# Patient Record
Sex: FEMALE | Race: OTHER | NOT HISPANIC OR LATINO | URBAN - METROPOLITAN AREA
[De-identification: names, ages, dates, MRNs, and addresses within clinical notes are randomized per-mention and may not be internally consistent; named-entity substitution may affect disease eponyms.]

---
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
## 2021-02-09 NOTE — ED PROVIDER NOTE - CONDUCTED A DETAILED DISCUSSION WITH PATIENT AND/OR GUARDIAN REGARDING, MDM
[Good] : ~his/her~  mood as  good [Yes] : Yes [Patient reported mammogram was normal] : Patient reported mammogram was normal [Patient reported PAP Smear was normal] : Patient reported PAP Smear was normal [Patient reported colonoscopy was normal] : Patient reported colonoscopy was normal [Fully functional (bathing, dressing, toileting, transferring, walking, feeding)] : Fully functional (bathing, dressing, toileting, transferring, walking, feeding) [Fully functional (using the telephone, shopping, preparing meals, housekeeping, doing laundry, using] : Fully functional and needs no help or supervision to perform IADLs (using the telephone, shopping, preparing meals, housekeeping, doing laundry, using transportation, managing medications and managing finances) [One fall no injury in past year] : Patient reported one fall in the past year without injury [0] : 2) Feeling down, depressed, or hopeless: Not at all (0) [] : No [de-identified] : good [de-identified] : some exerciuse [MammogramDate] : 2020 [PapSmearDate] : 2020 [BoneDensityDate] : 2019 [BoneDensityComments] : osteoporosis [ColonoscopyDate] : 2018 [ColonoscopyComments] : polyps need for outpatient follow-up

## 2021-08-28 ENCOUNTER — EMERGENCY (EMERGENCY)
Facility: HOSPITAL | Age: 21
LOS: 1 days | Discharge: HOME | End: 2021-08-28
Attending: STUDENT IN AN ORGANIZED HEALTH CARE EDUCATION/TRAINING PROGRAM
Payer: MEDICAID

## 2021-08-28 VITALS
RESPIRATION RATE: 18 BRPM | HEART RATE: 82 BPM | OXYGEN SATURATION: 100 % | DIASTOLIC BLOOD PRESSURE: 80 MMHG | TEMPERATURE: 98 F | SYSTOLIC BLOOD PRESSURE: 118 MMHG

## 2021-08-28 PROCEDURE — 71045 X-RAY EXAM CHEST 1 VIEW: CPT | Mod: 26

## 2021-08-28 PROCEDURE — 99284 EMERGENCY DEPT VISIT MOD MDM: CPT

## 2021-08-28 NOTE — ED PROVIDER NOTE - CLINICAL SUMMARY MEDICAL DECISION MAKING FREE TEXT BOX
Patient presenting with sob, lung clear, history of similar 2/2 panic attack. will obtain lab, xray, treat anxiety and reassess

## 2021-08-28 NOTE — ED PROVIDER NOTE - OBJECTIVE STATEMENT
21 y.o presenting with panic attack, woke up feeling sob. per guardian, similar occurred in the past. no fever, chills, n, v noted today. patient currently does not want to talk, patient mother states that she has been like this in the past with panic attacks.

## 2021-08-28 NOTE — ED PROVIDER NOTE - PATIENT PORTAL LINK FT
You can access the FollowMyHealth Patient Portal offered by [facility] by registering at the following website: [email or web address]. By joining [employer]’s FollowMyHealth portal, you will also be able to view your health information using other applications (apps) compatible with our system.
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
## 2021-08-28 NOTE — ED ADULT NURSE NOTE - OBJECTIVE STATEMENT
pt presents to ED due to panic attack. As per family, pt woke up feeling of sob and not talking. Lately pt is having stress with BF and family problems. Hx of panic attacks x2. Denies any other complaints.

## 2021-08-29 VITALS
TEMPERATURE: 98 F | HEART RATE: 73 BPM | OXYGEN SATURATION: 100 % | DIASTOLIC BLOOD PRESSURE: 72 MMHG | RESPIRATION RATE: 18 BRPM | SYSTOLIC BLOOD PRESSURE: 109 MMHG

## 2021-08-29 LAB
ALBUMIN SERPL ELPH-MCNC: 3.4 G/DL — LOW (ref 3.5–5)
ALP SERPL-CCNC: 93 U/L — SIGNIFICANT CHANGE UP (ref 40–120)
ALT FLD-CCNC: 39 U/L DA — SIGNIFICANT CHANGE UP (ref 10–60)
ANION GAP SERPL CALC-SCNC: 7 MMOL/L — SIGNIFICANT CHANGE UP (ref 5–17)
AST SERPL-CCNC: 19 U/L — SIGNIFICANT CHANGE UP (ref 10–40)
BASOPHILS # BLD AUTO: 0.05 K/UL — SIGNIFICANT CHANGE UP (ref 0–0.2)
BASOPHILS NFR BLD AUTO: 0.6 % — SIGNIFICANT CHANGE UP (ref 0–2)
BILIRUB SERPL-MCNC: 0.3 MG/DL — SIGNIFICANT CHANGE UP (ref 0.2–1.2)
BUN SERPL-MCNC: 10 MG/DL — SIGNIFICANT CHANGE UP (ref 7–18)
CALCIUM SERPL-MCNC: 8.7 MG/DL — SIGNIFICANT CHANGE UP (ref 8.4–10.5)
CHLORIDE SERPL-SCNC: 109 MMOL/L — HIGH (ref 96–108)
CO2 SERPL-SCNC: 24 MMOL/L — SIGNIFICANT CHANGE UP (ref 22–31)
CREAT SERPL-MCNC: 0.63 MG/DL — SIGNIFICANT CHANGE UP (ref 0.5–1.3)
EOSINOPHIL # BLD AUTO: 0.17 K/UL — SIGNIFICANT CHANGE UP (ref 0–0.5)
EOSINOPHIL NFR BLD AUTO: 1.9 % — SIGNIFICANT CHANGE UP (ref 0–6)
ETHANOL SERPL-MCNC: <3 MG/DL — SIGNIFICANT CHANGE UP (ref 0–10)
GLUCOSE SERPL-MCNC: 92 MG/DL — SIGNIFICANT CHANGE UP (ref 70–99)
HCG SERPL-ACNC: <1 MIU/ML — SIGNIFICANT CHANGE UP
HCT VFR BLD CALC: 37.2 % — SIGNIFICANT CHANGE UP (ref 34.5–45)
HGB BLD-MCNC: 12.6 G/DL — SIGNIFICANT CHANGE UP (ref 11.5–15.5)
IMM GRANULOCYTES NFR BLD AUTO: 0.5 % — SIGNIFICANT CHANGE UP (ref 0–1.5)
LYMPHOCYTES # BLD AUTO: 1.63 K/UL — SIGNIFICANT CHANGE UP (ref 1–3.3)
LYMPHOCYTES # BLD AUTO: 18.5 % — SIGNIFICANT CHANGE UP (ref 13–44)
MCHC RBC-ENTMCNC: 29.6 PG — SIGNIFICANT CHANGE UP (ref 27–34)
MCHC RBC-ENTMCNC: 33.9 GM/DL — SIGNIFICANT CHANGE UP (ref 32–36)
MCV RBC AUTO: 87.5 FL — SIGNIFICANT CHANGE UP (ref 80–100)
MONOCYTES # BLD AUTO: 0.91 K/UL — HIGH (ref 0–0.9)
MONOCYTES NFR BLD AUTO: 10.3 % — SIGNIFICANT CHANGE UP (ref 2–14)
NEUTROPHILS # BLD AUTO: 6.01 K/UL — SIGNIFICANT CHANGE UP (ref 1.8–7.4)
NEUTROPHILS NFR BLD AUTO: 68.2 % — SIGNIFICANT CHANGE UP (ref 43–77)
NRBC # BLD: 0 /100 WBCS — SIGNIFICANT CHANGE UP (ref 0–0)
PLATELET # BLD AUTO: 288 K/UL — SIGNIFICANT CHANGE UP (ref 150–400)
POTASSIUM SERPL-MCNC: 3.9 MMOL/L — SIGNIFICANT CHANGE UP (ref 3.5–5.3)
POTASSIUM SERPL-SCNC: 3.9 MMOL/L — SIGNIFICANT CHANGE UP (ref 3.5–5.3)
PROT SERPL-MCNC: 7.6 G/DL — SIGNIFICANT CHANGE UP (ref 6–8.3)
RBC # BLD: 4.25 M/UL — SIGNIFICANT CHANGE UP (ref 3.8–5.2)
RBC # FLD: 12.6 % — SIGNIFICANT CHANGE UP (ref 10.3–14.5)
SODIUM SERPL-SCNC: 140 MMOL/L — SIGNIFICANT CHANGE UP (ref 135–145)
WBC # BLD: 8.81 K/UL — SIGNIFICANT CHANGE UP (ref 3.8–10.5)
WBC # FLD AUTO: 8.81 K/UL — SIGNIFICANT CHANGE UP (ref 3.8–10.5)

## 2021-08-29 PROCEDURE — 93005 ELECTROCARDIOGRAM TRACING: CPT

## 2021-08-29 PROCEDURE — 84702 CHORIONIC GONADOTROPIN TEST: CPT

## 2021-08-29 PROCEDURE — 85025 COMPLETE CBC W/AUTO DIFF WBC: CPT

## 2021-08-29 PROCEDURE — 99284 EMERGENCY DEPT VISIT MOD MDM: CPT | Mod: 25

## 2021-08-29 PROCEDURE — 71045 X-RAY EXAM CHEST 1 VIEW: CPT

## 2021-08-29 PROCEDURE — 80053 COMPREHEN METABOLIC PANEL: CPT

## 2021-08-29 PROCEDURE — 36415 COLL VENOUS BLD VENIPUNCTURE: CPT

## 2021-08-29 PROCEDURE — 80307 DRUG TEST PRSMV CHEM ANLYZR: CPT

## 2021-08-29 PROCEDURE — 96374 THER/PROPH/DIAG INJ IV PUSH: CPT

## 2021-08-29 RX ADMIN — Medication 0.5 MILLIGRAM(S): at 01:12

## 2021-10-02 NOTE — ED PEDIATRIC TRIAGE NOTE - BSA (M2)
From: Humberto Burch  To: Pennie Gray DO  Sent: 10/1/2021 5:03 PM CDT  Subject: Need to be seen     Hello I was wondering if there was any way I can be seen ASAP ? Like 10-3-2021?    I had a situation come up I’m conserved about and would like some advi 1.79

## 2021-11-01 ENCOUNTER — EMERGENCY (EMERGENCY)
Facility: HOSPITAL | Age: 21
LOS: 1 days | Discharge: HOME | End: 2021-11-01
Attending: EMERGENCY MEDICINE
Payer: MEDICAID

## 2021-11-01 VITALS
WEIGHT: 177.91 LBS | RESPIRATION RATE: 16 BRPM | SYSTOLIC BLOOD PRESSURE: 120 MMHG | DIASTOLIC BLOOD PRESSURE: 85 MMHG | HEIGHT: 64 IN | TEMPERATURE: 98 F | HEART RATE: 76 BPM | OXYGEN SATURATION: 100 %

## 2021-11-01 PROCEDURE — 99283 EMERGENCY DEPT VISIT LOW MDM: CPT

## 2021-11-02 NOTE — ED PROVIDER NOTE - PATIENT PORTAL LINK FT
You can access the FollowMyHealth Patient Portal offered by [facility] by registering at the following website: [email or web address]. By joining [employer]’s FollowMyHealth portal, you will also be able to view your health information using other applications (apps) compatible with our system.

## 2021-11-02 NOTE — ED PROVIDER NOTE - NSFOLLOWUPINSTRUCTIONS_ED_ALL_ED_FT
Anxiety is a condition that causes you to feel extremely worried or nervous. The feelings are so strong that they can cause problems with your daily activities or sleep. Anxiety may be triggered by something you fear, or it may happen without a cause. Family or work stress, smoking, caffeine, and alcohol can increase your risk for anxiety. Certain medicines or health conditions can also increase your risk. Anxiety can become a long-term condition if it is not managed or treated.    What other common signs and symptoms may occur with anxiety?   •Fatigue or muscle tightness      •Shaking, restlessness, or irritability      •Problems focusing      •Trouble sleeping      •Feeling jumpy, easily startled, or dizzy      •Rapid heartbeat or shortness of breath      What do I need to tell my healthcare provider about my anxiety? Tell your healthcare provider when your symptoms began and what triggers them. Tell your provider if anxiety affects your daily activities. Your provider will also ask about your medical history and if you have family members with a similar condition. Tell your provider about your past and present alcohol, nicotine, or drug use.    What can I do to manage anxiety? You may get medicines to help you feel calm and relaxed, and to decrease your symptoms. Medicines are usually given together with therapy or other treatments. The following can help you manage anxiety:  •Talk to someone about your anxiety. Your healthcare provider may suggest counseling. Cognitive behavioral therapy can help you understand and change how you react to events that trigger your symptoms. You might feel more comfortable talking with a friend or family member about your anxiety. Choose someone you know will be supportive and encouraging.      •Find ways to relax. Activities such as exercise, meditation, or listening to music can help you relax. Spend time with friends, or do things you enjoy.      •Practice deep breathing. Deep breathing can help you relax when you feel anxious. Focus on taking slow, deep breaths several times a day, or during an anxiety attack. Breathe in through your nose and out through your mouth.      •Create a regular sleep routine. Regular sleep can help you feel calmer during the day. Go to sleep and wake up at the same times every day. Do not watch television or use the computer right before bed. Your room should be comfortable, dark, and quiet.      •Eat a variety of healthy foods. Healthy foods include fruits, vegetables, low-fat dairy products, lean meats, fish, whole-grain breads, and cooked beans. Healthy foods can help you feel less anxious and have more energy.  Healthy Foods           •Exercise regularly. Exercise can increase your energy level. Exercise may also lift your mood and help you sleep better. Your healthcare provider can help you create an exercise plan.   FAMILY WALKING FOR EXERCISE           •Do not smoke. Nicotine and other chemicals in cigarettes and cigars can increase anxiety. Ask your healthcare provider for information if you currently smoke and need help to quit. E-cigarettes or smokeless tobacco still contain nicotine. Talk to your healthcare provider before you use these products.      •Do not have caffeine. Caffeine can make your symptoms worse. Do not have foods or drinks that are meant to increase your energy level.      •Limit or do not drink alcohol. Ask your healthcare provider if alcohol is safe for you. You may not be able to drink alcohol if you take certain anxiety or depression medicines. Limit alcohol to 1 drink per day if you are a woman. Limit alcohol to 2 drinks per day if you are a man. A drink of alcohol is 12 ounces of beer, 5 ounces of wine, or 1½ ounces of liquor.      •Do not use drugs. Drugs can make your anxiety worse. It can also make anxiety hard to manage. Talk to your healthcare provider if you use drugs and want help to quit.      Call your local emergency number (911 in the [place]) if:   •You have chest pain, tightness, or heaviness that may spread to your shoulders, arms, jaw, neck, or back.      •You feel like hurting yourself or someone else.      When should I call my doctor?   •Your symptoms get worse or do not get better with treatment.      •Your anxiety keeps you from doing your regular daily activities.      •You have new symptoms since your last visit.      •You have questions or concerns about your condition or care.      CARE AGREEMENT:    You have the right to help plan your care. Learn about your health condition and how it may be treated. Discuss treatment options with your healthcare providers to decide what care you want to receive. You always have the right to refuse treatment.

## 2021-11-02 NOTE — ED ADULT NURSE NOTE - NSIMPLEMENTINTERV_GEN_ALL_ED
Implemented All Universal Safety Interventions:  [place] to call system. Call bell, personal items and telephone within reach. Instruct patient to call for assistance. Room bathroom lighting operational. Non-slip footwear when patient is off stretcher. Physically safe environment: no spills, clutter or unnecessary equipment. Stretcher in lowest position, wheels locked, appropriate side rails in place.

## 2021-11-02 NOTE — ED PROVIDER NOTE - NSFOLLOWUPCLINICS_GEN_ALL_ED_FT
[facility]  Internal Medicine  [address] [place] [address]  Phone: [phone]  Fax: [phone]

## 2021-11-02 NOTE — ED PROVIDER NOTE - OBJECTIVE STATEMENT
22 y/o female, supportive adult boyfriend in attendance, states 1 hour prior to ED arrival she was stressed and anxious prompting her boyfriend to bring her to ER. Pt relates stress factors include working, being in school, and caring for 10 y/o twin siblings while parents are away at work. No SI. No auditory or visual hallucinations.

## 2021-11-02 NOTE — ED PROVIDER NOTE - CLINICAL SUMMARY MEDICAL DECISION MAKING FREE TEXT BOX
Pt feels much better after napping in ER. No SI. Requesting to be discharged. States her parents will return from work tomorrow and has good support system. Pt feels much better after napping in ER. No SI. Requesting to be discharged. States her parents will return from work tomorrow and has good support system.  Pt is well appearing, has no new complaints and able to walk with normal gait. Pt is stable for discharge and follow up with medical doctor. Pt educated on care and need for follow up. Discussed anticipatory guidance and return precautions. Questions answered. I had a detailed discussion with the patient and/or guardian regarding the historical points, exam findings, and any diagnostic results supporting the discharge diagnosis.

## 2022-03-15 NOTE — ED ADULT TRIAGE NOTE - CHIEF COMPLAINT QUOTE
Patient states she and the baby are doing well. She has no questions or concerns at this time. " I'm stress with family issues"   Breathing hard as per family.

## 2022-05-25 ENCOUNTER — EMERGENCY (EMERGENCY)
Facility: HOSPITAL | Age: 22
LOS: 1 days | Discharge: HOME | End: 2022-05-25
Attending: EMERGENCY MEDICINE
Payer: MEDICAID

## 2022-05-25 VITALS
HEART RATE: 86 BPM | DIASTOLIC BLOOD PRESSURE: 87 MMHG | TEMPERATURE: 99 F | OXYGEN SATURATION: 99 % | SYSTOLIC BLOOD PRESSURE: 125 MMHG | RESPIRATION RATE: 16 BRPM | HEIGHT: 62 IN | WEIGHT: 169.98 LBS

## 2022-05-25 PROCEDURE — 99284 EMERGENCY DEPT VISIT MOD MDM: CPT

## 2022-05-25 RX ORDER — PSEUDOEPHEDRINE HCL 30 MG
30 TABLET ORAL ONCE
Refills: 0 | Status: COMPLETED | OUTPATIENT
Start: 2022-05-25 | End: 2022-05-25

## 2022-05-25 RX ORDER — GUAIFENESIN/DEXTROMETHORPHAN 600MG-30MG
15 TABLET, EXTENDED RELEASE 12 HR ORAL ONCE
Refills: 0 | Status: COMPLETED | OUTPATIENT
Start: 2022-05-25 | End: 2022-05-25

## 2022-05-25 NOTE — ED PROVIDER NOTE - ENMT, MLM
normal Airway patent, Nasal mucosa clear. Mouth with normal mucosa. Throat has no vesicles, no oropharyngeal exudates and uvula is midline. Airway patent, Nasal mucosa clear. Mouth with normal mucosa. Throat with no erythema/swelling, no oropharyngeal exudates, no stridor, and uvula is midline.

## 2022-05-25 NOTE — ED PROVIDER NOTE - NSFOLLOWUPINSTRUCTIONS_ED_ALL_ED_FT
Upper Respiratory Infection, Adult      An upper respiratory infection (URI) is a common viral infection of the nose, throat, and upper air passages that lead to the lungs. The most common type of URI is the common cold. URIs usually get better on their own, without medical treatment.      What are the causes?    A URI is caused by a virus. You may catch a virus by:  •Breathing in droplets from an infected person's cough or sneeze.      •Touching something that has been exposed to the virus (contaminated) and then touching your mouth, nose, or eyes.        What increases the risk?    You are more likely to get a URI if:  •You are very young or very old.      •It is mark anthony or winter.      •You have close contact with others, such as at a , school, or health care facility.      •You smoke.      •You have long-term (chronic) heart or lung disease.      •You have a weakened disease-fighting (immune) system.      •You have nasal allergies or asthma.      •You are experiencing a lot of stress.      •You work in an area that has poor air circulation.      •You have poor nutrition.        What are the signs or symptoms?    A URI usually involves some of the following symptoms:  •Runny or stuffy (congested) nose.      •Sneezing.      •Cough.      •Sore throat.      •Headache.      •Fatigue.      •Fever.      •Loss of appetite.      •Pain in your forehead, behind your eyes, and over your cheekbones (sinus pain).      •Muscle aches.      •Redness or irritation of the eyes.      •Pressure in the ears or face.        How is this diagnosed?    This condition may be diagnosed based on your medical history and symptoms, and a physical exam. Your health care provider may use a cotton swab to take a mucus sample from your nose (nasal swab). This sample can be tested to determine what virus is causing the illness.      How is this treated?    URIs usually get better on their own within 7–10 days. You can take steps at home to relieve your symptoms. Medicines cannot cure URIs, but your health care provider may recommend certain medicines to help relieve symptoms, such as:  •Over-the-counter cold medicines.      •Cough suppressants. Coughing is a type of defense against infection that helps to clear the respiratory system, so take these medicines only as recommended by your health care provider.      •Fever-reducing medicines.        Follow these instructions at home:    Activity     •Rest as needed.      •If you have a fever, stay home from work or school until your fever is gone or until your health care provider says you are no longer contagious. Your health care provider may have you wear a face mask to prevent your infection from spreading.      Relieving symptoms     •Gargle with a salt-water mixture 3–4 times a day or as needed. To make a salt-water mixture, completely dissolve ½–1 tsp of salt in 1 cup of warm water.      •Use a cool-mist humidifier to add moisture to the air. This can help you breathe more easily.        Eating and drinking      •Drink enough fluid to keep your urine pale yellow.      •Eat soups and other clear broths.        General instructions      •Take over-the-counter and prescription medicines only as told by your health care provider. These include cold medicines, fever reducers, and cough suppressants.      • Do not use any products that contain nicotine or tobacco, such as cigarettes and e-cigarettes. If you need help quitting, ask your health care provider.       •Stay away from secondhand smoke.      •Stay up to date on all immunizations, including the yearly (annual) flu vaccine.      •Keep all follow-up visits as told by your health care provider. This is important.        How to prevent the spread of infection to others    •URIs can be passed from person to person (are contagious). To prevent the infection from spreading:  •Wash your hands often with soap and water. If soap and water are not available, use hand .      •Avoid touching your mouth, face, eyes, or nose.      •Cough or sneeze into a tissue or your sleeve or elbow instead of into your hand or into the air.          Contact a health care provider if:    •You are getting worse instead of better.      •You have a fever or chills.      •Your mucus is brown or red.      •You have yellow or brown discharge coming from your nose.      •You have pain in your face, especially when you bend forward.      •You have swollen neck glands.      •You have pain while swallowing.      •You have white areas in the back of your throat.        Get help right away if:    •You have shortness of breath that gets worse.    •You have severe or persistent:  •Headache.      •Ear pain.      •Sinus pain.      •Chest pain.      •You have chronic lung disease along with any of the following:  •Wheezing.      •Prolonged cough.      •Coughing up blood.      •A change in your usual mucus.        •You have a stiff neck.    •You have changes in your:  •Vision.      •Hearing.      •Thinking.      •Mood.          Summary    •An upper respiratory infection (URI) is a common infection of the nose, throat, and upper air passages that lead to the lungs.      •A URI is caused by a virus.      •URIs usually get better on their own within 7–10 days.      •Medicines cannot cure URIs, but your health care provider may recommend certain medicines to help relieve symptoms.      This information is not intended to replace advice given to you by your health care provider. Make sure you discuss any questions you have with your health care provider.      Document Revised: 08/26/2021 Document Reviewed: 08/26/2021    [employer] Patient Education © 2022 [employer]vier Inc.

## 2022-05-25 NOTE — ED ADULT TRIAGE NOTE - HEIGHT IN CM
157.48 I will SWITCH the dose or number of times a day I take the medications listed below when I get home from the hospital:  None

## 2022-05-25 NOTE — ED PROVIDER NOTE - PATIENT PORTAL LINK FT
You can access the FollowMyHealth Patient Portal offered by [facility] by registering at the following website: [email or web address]. By joining [employer]’s FollowMyHealth portal, you will also be able to view your health information using other applications (apps) compatible with our system.

## 2022-05-25 NOTE — ED PROVIDER NOTE - OBJECTIVE STATEMENT
22 year old female with no PHMx presents to the ED with complaints of 1 week of nasal congestion, cough, sore throat, headache. Patient also states she occasionally feels difficulty breathing. States she tried Albuterol with no relief. Denies fever, vomiting, diarrhea, and chest pain. Denies history of being diagnosed with asthma. Reports having taken a negative COVID test, with the most recent one 2 days ago. Denies sick contacts. Denies smoking, alcohol or drug use. States she had COVID vaccination x2.  NKDA.

## 2022-05-25 NOTE — ED PROVIDER NOTE - CLINICAL SUMMARY MEDICAL DECISION MAKING FREE TEXT BOX
Suspect viral upper respiratory infection. Patient is well appearing. Will send RVP and give symptomatic medication.

## 2022-05-26 LAB
HCOV PNL SPEC NAA+PROBE: DETECTED
RAPID RVP RESULT: DETECTED
SARS-COV-2 RNA SPEC QL NAA+PROBE: SIGNIFICANT CHANGE UP

## 2022-05-26 PROCEDURE — 99285 EMERGENCY DEPT VISIT HI MDM: CPT

## 2022-05-26 PROCEDURE — 0225U NFCT DS DNA&RNA 21 SARSCOV2: CPT

## 2022-05-26 RX ADMIN — Medication 10 MILLILITER(S): at 00:01

## 2022-05-26 RX ADMIN — Medication 30 MILLIGRAM(S): at 00:01

## 2022-05-26 NOTE — ED ADULT NURSE NOTE - NSIMPLEMENTINTERV_GEN_ALL_ED
Implemented All Universal Safety Interventions:  [place] to call system. Call bell, personal items and telephone within reach. Instruct patient to call for assistance. Room bathroom lighting operational. Non-slip footwear when patient is off stretcher. Physically safe environment: no spills, clutter or unnecessary equipment. Stretcher in lowest position, wheels locked, appropriate side rails in place.

## 2022-10-13 ENCOUNTER — EMERGENCY (EMERGENCY)
Facility: HOSPITAL | Age: 22
LOS: 1 days | Discharge: HOME | End: 2022-10-13
Attending: EMERGENCY MEDICINE
Payer: MEDICAID

## 2022-10-13 VITALS
TEMPERATURE: 98 F | DIASTOLIC BLOOD PRESSURE: 68 MMHG | SYSTOLIC BLOOD PRESSURE: 101 MMHG | RESPIRATION RATE: 16 BRPM | WEIGHT: 160.06 LBS | HEART RATE: 72 BPM | OXYGEN SATURATION: 100 % | HEIGHT: 65 IN

## 2022-10-13 PROCEDURE — 99283 EMERGENCY DEPT VISIT LOW MDM: CPT

## 2022-10-13 NOTE — ED ADULT TRIAGE NOTE - CCCP TRG CHIEF CMPLNT
C/O ITCHY/BURNING SENSATION AROUND HER EYES FROM EYE OINTMENT '' TACROLIMUN OINTMENT''/allergic reaction

## 2022-10-13 NOTE — ED ADULT TRIAGE NOTE - BRAND OF COVID-19 VACCINATION
Pt Med's Printed instead of being sent to patient pharmacy . Resending them to pharmacy on file.   
Pfizer dose 1 and 2

## 2022-10-14 PROCEDURE — 99282 EMERGENCY DEPT VISIT SF MDM: CPT

## 2022-10-14 RX ORDER — HYDROCORTISONE 1 %
1 OINTMENT (GRAM) TOPICAL ONCE
Refills: 0 | Status: COMPLETED | OUTPATIENT
Start: 2022-10-14 | End: 2022-10-14

## 2022-10-14 RX ADMIN — Medication 1 APPLICATION(S): at 01:22

## 2022-10-14 NOTE — ED PROVIDER NOTE - OBJECTIVE STATEMENT
22 year old female denies PMH coming in with rash and burning sensation around eyes after using a tacrolimus ointment she was given for possible eczema around that area. never used this before. denies all other complaints. states has been washing her face with water all day and using ice to area but hasn't helped.

## 2022-10-14 NOTE — ED PROVIDER NOTE - NSTIMEPROVIDERCAREINITIATE_GEN_ER
Lab Results   Component Value Date    INR 2.69 (H) 07/05/2022    INR 2.80 (H) 06/21/2022    INR 2.31 (H) 06/15/2022    PROTIME 29.2 (H) 07/05/2022    PROTIME 30.1 (H) 06/21/2022    PROTIME 25.9 (H) 06/15/2022     DX: Hx of mitral valve replacement with mechanical valve   Range: 2.5-3.5  [facility]     13-Oct-2022 23:57

## 2022-10-14 NOTE — ED ADULT NURSE NOTE - OBJECTIVE STATEMENT
pt presents to ED with complaints of rash and burning sensation around eyes after using tacrolimus ointment. Denies chest pain, sob, nausea and vomiting.

## 2022-10-14 NOTE — ED PROVIDER NOTE - CLINICAL SUMMARY MEDICAL DECISION MAKING FREE TEXT BOX
22 year old female with rash around eyes. PE as above.  symptoms likely 2/2 cram side effect. advised cessation. will give topical steroid to use for 5 days. f/u derm. return precautions discussed.

## 2022-10-14 NOTE — ED PROVIDER NOTE - PATIENT PORTAL LINK FT
You can access the FollowMyHealth Patient Portal offered by [facility] by registering at the following website: [email or web address]. By joining [employer]’s FollowMyHealth portal, you will also be able to view your health information using other applications (apps) compatible with our system.

## 2022-10-14 NOTE — ED PROVIDER NOTE - NSFOLLOWUPINSTRUCTIONS_ED_ALL_ED_FT
[facility] Center[facility] CenterssianSpanishTagalogTraditional ChineseVietnamese                                                                                                                                          Tacrolimus ointment      What is this medication?    TACROLIMUS (ta KROE li mus) is used on the skin to treat eczema.    This medicine may be used for other purposes; ask your health care provider or pharmacist if you have questions.    COMMON BRAND NAME(S): Protopic      What should I tell my care team before I take this medication?    They need to know if you have any of these conditions:    •cold sores or shingles    •decreased immune function    •Netherton's syndrome    •receiving any form of light therapy (phototherapy, UVA, or UVB) to your skin    •skin infection    •an unusual or allergic reaction to tacrolimus, other medicines, foods, dyes, or preservatives    •pregnant or trying to get pregnant    •breast-feeding      How should I use this medication?    This medicine is for external use only. Follow the directions on the prescription label. Wash your hands before and after use. If treating a hand infection, wash hands before use only. Before applying this medicine, be sure your skin is completely dry. Apply a thin layer to the affected areas. Rub in gently and completely. Do not bathe, shower, or swim right after applying this medicine. This could wash off the ointment. Do not cover with a bandage or dressing unless your doctor or health care professional tells you to. You can wear normal clothing. Do not get this medicine in your eyes. If you do, rinse out with plenty of cool tap water. Do not use your medicine more often than directed.    A special MedGuide will be given to you by the pharmacist with each prescription and refill. Be sure to read this information carefully each time.    Talk to your pediatrician regarding the use of this medicine in children. Special care may be needed.    Overdosage: If you think you have taken too much of this medicine contact a poison control center or emergency room at once.    NOTE: This medicine is only for you. Do not share this medicine with others.      What if I miss a dose?    If you miss a dose, use it as soon as you can. If it is almost time for your next dose, use only that dose. Do not use double or extra doses.      What may interact with this medication?    •alcoholic beverages or medicines containing high percentages of alcohol    •calcium channel blockers like diltiazem, nifedipine, nimodipine, nisoldipine    •certain medicines used to treat fungal infections like itraconazole, ketoconazole, and fluconazole    •cimetidine    •erythromycin    •vaccines    This list may not describe all possible interactions. Give your health care provider a list of all the medicines, herbs, non-prescription drugs, or dietary supplements you use. Also tell them if you smoke, drink alcohol, or use illegal drugs. Some items may interact with your medicine.      What should I watch for while using this medication?    Improvements to your skin may occur after the first few weeks of treatment. Even though your skin looks better, it is important to keep using the ointment as instructed by your health care provider. Tell your health care professional if your condition does not get better within the first few weeks of treatment or if it gets worse.    This medicine can make you more sensitive to the sun. Keep out of the sun. If you cannot avoid being in the sun, wear protective clothing and use sunscreen. Do not use sun lamps or tanning beds/booths.While you are using this medicine, drinking alcohol may cause the skin or face to become flushed or red and feel hot. Let your health care provider know if you notice such reactions, especially if they bother you.      What side effects may I notice from receiving this medication?    Side effects that you should report to your doctor or health care professional as soon as possible:    •chickenpox infection    •cold sores or shingles    •extreme tiredness    •skin bleeding or change in your skin's appearance (color, change in a mole or freckle, new growth)    •skin infection or infection of hair follicles    •swollen glands    Side effects that usually do not require medical attention (report to your doctor or health care professional if they continue or are bothersome):    •headache    •increased sensitivity of the skin to hot or cold temperatures    •irritation at the site or sites where you apply this medicine including stinging, soreness, a burning feeling, tingling, or itching    •muscle pains    •nausea    This list may not describe all possible side effects. Call your doctor for medical advice about side effects. You may report side effects to FDA at 8-888-KRG-5108.      Where should I keep my medication?    Keep out of reach of children.    Store at room temperature between 15 and 30 degrees C (59 and 86 degrees F). Throw away any unused medicine after the expiration date.    NOTE: This sheet is a summary. It may not cover all possible information. If you have questions about this medicine, talk to your doctor, pharmacist, or health care provider.      © 2022 Elsevier/Gold Standard (2014-07-15 00:00:00)

## 2024-04-29 NOTE — ED PROVIDER NOTE - MUSCULOSKELETAL, MLM
Neuro will not see patient for headaches.  She is requesting referral for Dr. Varghese, Pain management.     If ok, please review/sign.    Spine appears normal, range of motion is not limited, no muscle or joint tenderness

## 2024-06-21 ENCOUNTER — EMERGENCY (EMERGENCY)
Facility: HOSPITAL | Age: 24
LOS: 1 days | Discharge: HOME | End: 2024-06-21
Attending: STUDENT IN AN ORGANIZED HEALTH CARE EDUCATION/TRAINING PROGRAM
Payer: MEDICAID

## 2024-06-21 VITALS
OXYGEN SATURATION: 99 % | HEIGHT: 64 IN | RESPIRATION RATE: 16 BRPM | TEMPERATURE: 98 F | WEIGHT: 163.14 LBS | HEART RATE: 62 BPM | DIASTOLIC BLOOD PRESSURE: 73 MMHG | SYSTOLIC BLOOD PRESSURE: 113 MMHG

## 2024-06-21 PROCEDURE — 99283 EMERGENCY DEPT VISIT LOW MDM: CPT | Mod: 25

## 2024-06-21 PROCEDURE — 99283 EMERGENCY DEPT VISIT LOW MDM: CPT

## 2024-06-21 RX ORDER — IBUPROFEN 200 MG
600 TABLET ORAL ONCE
Refills: 0 | Status: DISCONTINUED | OUTPATIENT
Start: 2024-06-21 | End: 2024-06-21

## 2024-06-21 RX ORDER — ERYTHROMYCIN BASE 5 MG/GRAM
1 OINTMENT (GRAM) OPHTHALMIC (EYE) ONCE
Refills: 0 | Status: COMPLETED | OUTPATIENT
Start: 2024-06-21 | End: 2024-06-21

## 2024-06-21 RX ADMIN — Medication 1 APPLICATION(S): at 10:31

## 2024-06-21 NOTE — ED PROVIDER NOTE - CLINICAL SUMMARY MEDICAL DECISION MAKING FREE TEXT BOX
24-year-old female, no past medical history presenting with chief complaint of left eyelid swelling for the past 2 days  consistent with blepharitis.  Visual acuity grossly intact, PERRLA, no conjunctival erythema, injection, teardrop pupil, or purulent discharge.  Patient instructed to apply warm compresses, will provide with erythromycin ointment applied to the lid margin.   Strict return precautions discussed at length, will discharge after medication administration.

## 2024-06-21 NOTE — ED PROVIDER NOTE - PHYSICAL EXAMINATION
Gen: NAD; well appearing  Head: NCAT. no orbital swelling, no sinus ttp  Eyes: EOMI, PERRLA, no conjunctival pallor, no scleral icterus. +L eyelid swelling. no purulent discharge, no conjunctival erythema  Ext: no edema, no deformity, warm and well-perfused  Skin: no rash or bruising

## 2024-06-21 NOTE — ED ADULT NURSE NOTE - NSFALLUNIVINTERV_ED_ALL_ED
Bed/Stretcher in lowest position, wheels locked, appropriate side rails in place/Call bell, personal items and telephone in reach/Instruct patient to call for assistance before getting out of bed/chair/stretcher/Non-slip footwear applied when patient is off stretcher/[place] to call system/Physically safe environment - no spills, clutter or unnecessary equipment/Purposeful proactive rounding/Room/bathroom lighting operational, light cord in reach

## 2024-06-21 NOTE — ED PROVIDER NOTE - OBJECTIVE STATEMENT
24-year-old female, no past medical history presenting with chief complaint of left eyelid swelling for the past 2 days.  Patient endorsing pain to the eyelid, otherwise denying any visual disturbances, double vision, blurry vision.  Denies any trauma to the eye.  Patient does endorse some tearing.  Does not wear contact lenses or glasses.   no nausea, vomiting, fever or chills.

## 2024-06-21 NOTE — ED ADULT NURSE NOTE - CHIEF COMPLAINT
The patient is a 24y Female complaining of  Adjacent Tissue Transfer Text: The defect edges were debeveled with a #15 scalpel blade. Given the location of the defect and the proximity to free margins an adjacent tissue transfer was deemed most appropriate. Using a sterile surgical marker, an appropriate flap was drawn incorporating the defect and placing the expected incisions within the relaxed skin tension lines where possible. The area thus outlined was incised deep to adipose tissue with a #15 scalpel blade. The skin margins were undermined to an appropriate distance in all directions utilizing iris scissors and carried over to close the primary defect.

## 2024-06-21 NOTE — ED PROVIDER NOTE - NSFOLLOWUPINSTRUCTIONS_ED_ALL_ED_FT
–   Apply warm compress to the affected eye 15x a day.   –you are provided with erythromycin ointment.  Please apply to the lid margin twice a day until medication is finished.  – Return for any worsening or concerning symptoms (see below) such as worsening swelling, visual disturbances, fevers, etc.  – Follow up with primary care doctor in the next 5 to 7 days.     Blepharitis  A normal eye compared to an eye with an inflamed eyelid, or blepharitis.  Blepharitis refers to inflammation of the eyelids. It is a common condition and can cause dryness or grittiness in the eyes. Other symptoms may include:  Reddish, scaly skin around the scalp and eyebrows.  Burning or itching of the eyelids.  Eye discharge at night that causes the eyelashes to stick together in the morning.  Eyelashes that fall out.  Redness of the eyes.  Sensitivity to light.  Follow these instructions at home:  Pay attention to any changes in how your eyes look or feel. Tell your health care provider about any changes. Follow these instructions to help with your condition.    Keeping clean    Wash your hands often with soap and water for at least 20 seconds.  Clean your eyes and wash the edges of your eyelids with diluted baby shampoo or commercial eyelid wipes. Do this 2 or more times a day.  Wash your face and eyebrows at least once a day.  Use a clean towel each time you dry your eyelids. Do not use this towel to clean or dry other areas of your body. Do not share your towel with anyone.  General instructions    Avoid wearing makeup until you get better. Do not share makeup with anyone.  Avoid rubbing your eyes.  Use warm compresses on the eyes for 5–10 minutes. Do this 1 or 2 times a day, or as told by your health care provider. You can use warm water on a towel, but a microwaveable heating pad often stays warm longer. The pad should be very warm but not hot enough to burn the skin.  If you were prescribed an antibiotic ointment or steroid drops, apply or use the medicine as told by your health care provider. Do not stop using the medicine even if you feel better.  Keep all follow-up visits. This is important.  Contact a health care provider if:  Your eyelids feel hot.  You have blisters or a rash on your eyelids.  The inflammation gets worse or does not go away in 2–4 days.  Get help right away if:  You have pain or redness that gets worse or spreads to other parts of your face.  Your vision changes.  You have pain when looking at lights or moving objects.  You have a fever.  Summary  Blepharitis refers to inflammation of the eyelids. It can cause dryness and grittiness in the eyes.  Pay attention to any changes in how your eyes look or feel. Tell your health care provider about any changes.  Follow home care instructions as told by your health care provider. Wash your hands often with soap and water for at least 20 seconds. Avoid wearing makeup. Do not rub your eyes.  If you were prescribed an antibiotic ointment or steroid drops, apply or use the medicine as told by your health care provider.  Get help right away if you have a fever, vision changes, pain or redness that gets worse or spreads to other parts of your face, or pain when looking at lights or moving objects.  This information is not intended to replace advice given to you by your health care provider. Make sure you discuss any questions you have with your health care provider.

## 2024-06-21 NOTE — ED PROVIDER NOTE - PATIENT PORTAL LINK FT
You can access the FollowMyHealth Patient Portal offered by [facility] by registering at the following website: [email or web address]. By joining [employer]’s FollowMyHealth portal, you will also be able to view your health information using other applications (apps) compatible with our system.

## 2024-06-22 RX ORDER — ERYTHROMYCIN BASE 5 MG/GRAM
1 OINTMENT (GRAM) OPHTHALMIC (EYE)
Qty: 1 | Refills: 0
Start: 2024-06-22 | End: 2024-06-28

## 2024-06-22 NOTE — ED POST DISCHARGE NOTE - ADDITIONAL DOCUMENTATION
Patient's  called states prescription for erythromycin ophthalmic was not sent.  I sent prescription to pharmacy on record.

## 2024-09-21 ENCOUNTER — EMERGENCY (EMERGENCY)
Facility: HOSPITAL | Age: 24
LOS: 1 days | Discharge: HOME | End: 2024-09-21
Admitting: EMERGENCY MEDICINE
Payer: MEDICAID

## 2024-09-21 VITALS
RESPIRATION RATE: 16 BRPM | TEMPERATURE: 98 F | SYSTOLIC BLOOD PRESSURE: 115 MMHG | HEIGHT: 65 IN | HEART RATE: 80 BPM | WEIGHT: 164.91 LBS | DIASTOLIC BLOOD PRESSURE: 69 MMHG | OXYGEN SATURATION: 98 %

## 2024-09-21 PROCEDURE — 99283 EMERGENCY DEPT VISIT LOW MDM: CPT

## 2024-09-21 NOTE — ED ADULT TRIAGE NOTE - CHIEF COMPLAINT QUOTE
Pt states she had something fall in her eye last night. Pt states this morning she has a lot of pain and her eye keeps tearing.  Pt denies vision changes.  Pt denies medical history.

## 2024-09-21 NOTE — ED PROVIDER NOTE - PROGRESS NOTE DETAILS
MARK CHAUDHARY: Patient reassessed, sitting comfortably in chair in NAD, denies any complaints. States feeling better, symptoms improved after Tetracaine drops. Pt is medically stable for discharge and follow up with PMD and opthalmology. The patient was given verbal and written discharge instructions. Specifically, instructions when to return to the ED and when to seek follow-up from their pcp was discussed. Any specialty follow-up was discussed, including how to make an appointment.  Instructions were discussed in simple, plain language and was understood by the patient. The patient understands that should their symptoms worsen or any new symptoms arise, they should return to the ED immediately for further evaluation. All pt's questions were answered. Patient verbalizes understanding.

## 2024-09-21 NOTE — ED PROVIDER NOTE - NSFOLLOWUPINSTRUCTIONS_ED_ALL_ED_FT
Follow [facility] clinic: [address], [place], [place], call ([phone]) to make next available appointment.     Take Tylenol 650mg (Two 325 mg pills) every 4-6 hours as needed for pain.     Use Erythromycin ointment 4 times a day for 3-5 days.   Use sunglasses outdoor setting.     Rest, drink plenty of fluids.  Advance activity as tolerated.   Follow up with your primary care physician in 48-72 hours- bring copies of your results.  Return to the ER for worsening or persistent symptoms, and/or ANY NEW OR CONCERNING SYMPTOMS. If you have issues obtaining follow up, please call: [phone] to obtain a doctor or specialist who takes your insurance in your area.

## 2024-09-21 NOTE — ED PROVIDER NOTE - OBJECTIVE STATEMENT
23 yo F with no PMH, accompanied father, presents to ED c/o right eye pain with foreign body sensation today with tearing. Reports she was cleaning mascara from eye, woke up with pain and tearing. Denies any fever, chills, pus discharge, visual disturbances, floaters, flashes, or any other complaints. denies any contact calvin uses.

## 2024-09-21 NOTE — ED PROVIDER NOTE - CLINICAL SUMMARY MEDICAL DECISION MAKING FREE TEXT BOX
25 yo F with no PMH, accompanied father, presents to ED c/o right eye pain with foreign body sensation today with tearing. well appearing female. there is no fever. no signs of infection. Suspect corneal abrasion. no contact calvin use. Plan: Check acuity, fluorescein stain, and reassess.

## 2024-09-21 NOTE — ED PROVIDER NOTE - PATIENT PORTAL LINK FT
You can access the FollowMyHealth Patient Portal offered by [facility] by registering at the following website: [email or web address]. By joining [facility]’s FollowMyHealth portal, you will also be able to view your health information using other applications (apps) compatible with our system.

## 2025-08-29 ENCOUNTER — OUTPATIENT (OUTPATIENT)
Dept: OUTPATIENT SERVICES | Facility: HOSPITAL | Age: 25
LOS: 1 days | End: 2025-08-29
Payer: MEDICAID

## 2025-08-29 ENCOUNTER — APPOINTMENT (OUTPATIENT)
Dept: RADIOLOGY | Facility: HOSPITAL | Age: 25
End: 2025-08-29

## 2025-08-29 DIAGNOSIS — R76.11 NONSPECIFIC REACTION TO TUBERCULIN SKIN TEST WITHOUT ACTIVE TUBERCULOSIS: ICD-10-CM

## 2025-08-29 PROCEDURE — 71046 X-RAY EXAM CHEST 2 VIEWS: CPT | Mod: 26
